# Patient Record
Sex: FEMALE | Race: WHITE | NOT HISPANIC OR LATINO | Employment: OTHER | URBAN - METROPOLITAN AREA
[De-identification: names, ages, dates, MRNs, and addresses within clinical notes are randomized per-mention and may not be internally consistent; named-entity substitution may affect disease eponyms.]

---

## 2021-09-29 ENCOUNTER — VBI (OUTPATIENT)
Dept: ADMINISTRATIVE | Facility: OTHER | Age: 71
End: 2021-09-29

## 2021-09-29 NOTE — TELEPHONE ENCOUNTER
Upon review of the In Basket request we were able to locate, review, and update the patient chart as requested for Mammogram     Any additional questions or concerns should be emailed to the Practice Liaisons via [email or web address] email, please do not reply via In Basket      Thank you  Al Rodriguez

## 2021-10-05 ENCOUNTER — ANNUAL EXAM (OUTPATIENT)
Dept: OBGYN CLINIC | Facility: CLINIC | Age: 71
End: 2021-10-05
Payer: MEDICARE

## 2021-10-05 VITALS
HEIGHT: 69 IN | SYSTOLIC BLOOD PRESSURE: 100 MMHG | BODY MASS INDEX: 28.73 KG/M2 | DIASTOLIC BLOOD PRESSURE: 60 MMHG | WEIGHT: 194 LBS

## 2021-10-05 DIAGNOSIS — Z87.81 HISTORY OF ANKLE FRACTURE: ICD-10-CM

## 2021-10-05 DIAGNOSIS — Z12.31 ENCOUNTER FOR SCREENING MAMMOGRAM FOR MALIGNANT NEOPLASM OF BREAST: ICD-10-CM

## 2021-10-05 DIAGNOSIS — N95.2 ATROPHIC VAGINITIS: ICD-10-CM

## 2021-10-05 DIAGNOSIS — E28.39 ESTROGEN DEFICIENCY: ICD-10-CM

## 2021-10-05 DIAGNOSIS — R39.15 URGENCY OF URINATION: ICD-10-CM

## 2021-10-05 DIAGNOSIS — Z01.419 ROUTINE GYNECOLOGICAL EXAMINATION: Primary | ICD-10-CM

## 2021-10-05 DIAGNOSIS — Z13.820 OSTEOPOROSIS SCREENING: ICD-10-CM

## 2021-10-05 PROCEDURE — G0101 CA SCREEN;PELVIC/BREAST EXAM: HCPCS | Performed by: OBSTETRICS & GYNECOLOGY

## 2021-10-05 RX ORDER — ESTRADIOL 0.1 MG/G
1 CREAM VAGINAL
Qty: 42.5 G | Refills: 3 | Status: SHIPPED | OUTPATIENT
Start: 2021-10-05 | End: 2022-10-05

## 2022-12-15 DIAGNOSIS — N95.2 ATROPHIC VAGINITIS: ICD-10-CM

## 2022-12-15 RX ORDER — ESTRADIOL 0.1 MG/G
CREAM VAGINAL
Qty: 43 G | Refills: 0 | Status: SHIPPED | OUTPATIENT
Start: 2022-12-15

## 2023-10-25 ENCOUNTER — TELEPHONE (OUTPATIENT)
Dept: OBGYN CLINIC | Facility: CLINIC | Age: 73
End: 2023-10-25

## 2023-10-25 NOTE — TELEPHONE ENCOUNTER
[facility] is requesting to restart Estrace vaginal cream. Advised pt to schedule an appt. Transferred her to .

## 2023-10-27 PROBLEM — Z01.419 ENCOUNTER FOR GYNECOLOGICAL EXAMINATION WITHOUT ABNORMAL FINDING: Status: ACTIVE | Noted: 2023-10-27

## 2023-10-27 PROBLEM — E28.39 ESTROGEN DEFICIENCY: Status: ACTIVE | Noted: 2023-10-27

## 2023-10-27 PROBLEM — Z87.81 HISTORY OF ANKLE FRACTURE: Status: ACTIVE | Noted: 2023-10-27

## 2023-10-27 PROBLEM — N95.2 ATROPHIC VAGINITIS: Status: ACTIVE | Noted: 2023-10-27

## 2023-11-06 ENCOUNTER — TELEPHONE (OUTPATIENT)
Dept: OBGYN CLINIC | Facility: CLINIC | Age: 73
End: 2023-11-06

## 2023-11-06 ENCOUNTER — ANNUAL EXAM (OUTPATIENT)
Dept: OBGYN CLINIC | Facility: CLINIC | Age: 73
End: 2023-11-06

## 2023-11-06 VITALS
HEIGHT: 71 IN | BODY MASS INDEX: 28.9 KG/M2 | WEIGHT: 206.4 LBS | SYSTOLIC BLOOD PRESSURE: 136 MMHG | DIASTOLIC BLOOD PRESSURE: 60 MMHG

## 2023-11-06 DIAGNOSIS — Z12.31 ENCOUNTER FOR SCREENING MAMMOGRAM FOR MALIGNANT NEOPLASM OF BREAST: ICD-10-CM

## 2023-11-06 DIAGNOSIS — N95.2 ATROPHIC VAGINITIS: ICD-10-CM

## 2023-11-06 DIAGNOSIS — E28.39 ESTROGEN DEFICIENCY: ICD-10-CM

## 2023-11-06 DIAGNOSIS — Z01.411 ENCOUNTER FOR GYNECOLOGICAL EXAMINATION WITH ABNORMAL FINDING: Primary | ICD-10-CM

## 2023-11-06 DIAGNOSIS — N95.1 MENOPAUSAL SYMPTOMS: ICD-10-CM

## 2023-11-06 DIAGNOSIS — R10.2 PELVIC PRESSURE IN FEMALE: ICD-10-CM

## 2023-11-06 DIAGNOSIS — Z87.81 HISTORY OF ANKLE FRACTURE: ICD-10-CM

## 2023-11-06 DIAGNOSIS — Z13.820 SCREENING FOR OSTEOPOROSIS: ICD-10-CM

## 2023-11-06 PROBLEM — N81.89 PELVIC FLOOR RELAXATION: Status: ACTIVE | Noted: 2023-11-06

## 2023-11-06 RX ORDER — VALSARTAN AND HYDROCHLOROTHIAZIDE 160; 25 MG/1; MG/1
1 TABLET ORAL DAILY
COMMUNITY

## 2023-11-06 RX ORDER — ASCORBIC ACID 500 MG
500 TABLET ORAL DAILY
COMMUNITY

## 2023-11-06 RX ORDER — ESTRADIOL 10 UG/1
1 INSERT VAGINAL 2 TIMES WEEKLY
Qty: 24 TABLET | Refills: 4 | Status: SHIPPED | OUTPATIENT
Start: 2023-11-06

## 2023-11-06 RX ORDER — MAGNESIUM OXIDE 400 MG/1
400 TABLET ORAL DAILY
COMMUNITY

## 2023-11-06 NOTE — PATIENT INSTRUCTIONS
Calcium 1200-1500mg + 800-1000 IU Vit D daily unless otherwise directed. Avoid falls. Exercise 150 minutes per week minimum including weight bearing exercises. DEXA scan-       . No further paps after age 72 as long as there has been adequate normal paps completed, no history of NIKI 2  or a more severe pap diagnosis in the last 20 years. Annual mammogram and monthly breast self exam recommended . Colonoscopy-        .  Amalia Cowper 20 times twice daily. Silicone based lubricant with sex. Vaginal moisturizers twice weekly as needed.

## 2023-11-06 NOTE — PROGRESS NOTES
[address] [place] [address], [address], [facility], [address] [facility],    ASSESSMENT/PLAN: Christopher Justin is a 81 y.o. No obstetric history on file. who presents for annual gynecologic exam.    Encounter for routine gynecologic examination  - Routine well woman exam completed today. - Cervical Cancer Screening: Current ASCCP Guidelines reviewed. Last Pap: 10/05/2021 . Next Pap Due: done   - Breast Cancer Screening: Last Mammogram 03/16/2022, order given   - Colorectal cancer screening was not ordered. - The following were reviewed in today's visit: breast self exam, mammography screening ordered, menopause, osteoporosis, adequate intake of calcium and vitamin D, exercise, healthy diet, DEXA ordered, and colonoscopy discussed and ordered    Additional problems addressed during this visit:  1. Encounter for gynecological examination with abnormal finding    2. Estrogen deficiency  -     DXA bone density spine hip and pelvis; Future  -     estradiol (VAGIFEM, YUVAFEM) 10 MCG TABS vaginal tablet; Insert 1 tablet (10 mcg total) into the vagina 2 (two) times a week  -     Ambulatory Referral to Gynecology; Future    3. Atrophic vaginitis  -     estradiol (VAGIFEM, YUVAFEM) 10 MCG TABS vaginal tablet; Insert 1 tablet (10 mcg total) into the vagina 2 (two) times a week  -     Ambulatory Referral to Gynecology; Future    4. History of ankle fracture  -     DXA bone density spine hip and pelvis; Future    5. Encounter for screening mammogram for malignant neoplasm of breast  -     Mammo screening bilateral w 3d & cad; Future    6. Menopausal symptoms  Comments:  Hx of pellet use > 4 hot flashs a day . Referral to  Dr Lorene Castellanos  Orders:  -     DXA bone density spine hip and pelvis; Future  -     estradiol (VAGIFEM, YUVAFEM) 10 MCG TABS vaginal tablet; Insert 1 tablet (10 mcg total) into the vagina 2 (two) times a week  -     Ambulatory Referral to Gynecology; Future    7.  Pelvic pressure in female  Comments:  Urgency w  urination  Pessary  fitting attempted  pt  not like  the placement and removal  No relaxation noted on exam.  referral to core  3    8. Screening for osteoporosis  -     DXA bone density spine hip and pelvis; Future        CC:  Annual Gynecologic Examination    HPI: Mayda Carson is a 81 y.o. No obstetric history on file. who presents for annual gynecologic examination. 82 yo here for gyn  fu  No bleeding, bloating or  satiety. + hot flashes    hx of  pelet  use   for  5 yeatrs  stopped in   2021. Uses the   estrogen   cream but messy       The following portions of the patient's history were reviewed and updated as appropriate: She  has a past medical history of Fibroids, Gastric reflux, and Hypertension. She  has a past surgical history that includes Hysterectomy; Mammo (historical); and Knee surgery (2020). Her family history is not on file. She  reports that she has never smoked. She has never used smokeless tobacco. She reports current alcohol use. She reports that she does not currently use drugs. Current Outpatient Medications   Medication Sig Dispense Refill    ascorbic acid (VITAMIN C) 500 MG tablet Take 500 mg by mouth daily      Cholecalciferol 25 MCG (1000 UT) tablet Take 1,000 Units by mouth daily      estradiol (VAGIFEM, YUVAFEM) 10 MCG TABS vaginal tablet Insert 1 tablet (10 mcg total) into the vagina 2 (two) times a week 24 tablet 4    losartan-hydrochlorothiazide (HYZAAR) 100-25 MG per tablet Take 1 tablet by mouth daily      magnesium oxide (MAG-OX) 400 mg tablet Take 400 mg by mouth daily      tretinoin (RETIN-A) 0.025 % cream APPLY PEA SIZE AMOUNT TO FACE NIGHTLY AS TOLERATED      valsartan-hydrochlorothiazide (DIOVAN-HCT) 160-25 MG per tablet Take 1 tablet by mouth daily       No current facility-administered medications for this visit. She has No Known Allergies. .    Review of Systems:  All systems normal except as noted in HPI          Objective:  BP 136/60 (BP Location: Left arm, Patient Position: Sitting, Cuff Size: Standard)   Ht 5' 10.5" (1.791 m)   Wt 93.6 kg (206 lb 6.4 oz)   Breastfeeding No   BMI 29.20 kg/m²    Physical Exam  Vitals and nursing note reviewed. Constitutional:       Appearance: Normal appearance. HENT:      Head: Normocephalic. Cardiovascular:      Rate and Rhythm: Normal rate and regular rhythm. Pulses: Normal pulses. Heart sounds: Normal heart sounds. Pulmonary:      Effort: Pulmonary effort is normal.      Breath sounds: Normal breath sounds. Chest:      Chest wall: No mass, lacerations, swelling, tenderness or edema. Breasts: Evaristo Score is 4. Breasts are symmetrical.      Right: Normal. No swelling, bleeding, inverted nipple, mass, nipple discharge, skin change or tenderness. Left: No swelling, bleeding, inverted nipple, mass, nipple discharge, skin change or tenderness. Abdominal:      General: Abdomen is flat. Bowel sounds are normal.      Palpations: Abdomen is soft. Genitourinary:     General: Normal vulva. Exam position: Lithotomy position. Pubic Area: No rash. Evaristo stage (genital): 4.      Labia:         Right: No rash, tenderness or lesion. Left: No rash, tenderness or lesion. Urethra: No urethral pain, urethral swelling or urethral lesion. Vagina: Normal.      Uterus: Absent. Adnexa: Right adnexa normal and left adnexa normal.      Rectum: Normal.            Comments: Levators   3   Attempted placement of pessary   + good support    Tight introitus    Musculoskeletal:         General: Normal range of motion. Cervical back: Neck supple. Lymphadenopathy:      Upper Body:      Right upper body: No supraclavicular, axillary or pectoral adenopathy. Left upper body: No supraclavicular, axillary or pectoral adenopathy. Lower Body: No right inguinal adenopathy. No left inguinal adenopathy. Skin:     General: Skin is warm and dry. Neurological:      General: No focal deficit present. Mental Status: She is alert. Psychiatric:         Mood and Affect: Mood normal.         Behavior: Behavior normal.         Thought Content:  Thought content normal.         Judgment: Judgment normal.

## 2023-11-06 NOTE — TELEPHONE ENCOUNTER
Patient asked about a mammogram order on her way out of her appointment today. If appropriate, please enter mammo order - I will mail it to patient. Thank you!

## 2023-12-18 ENCOUNTER — TELEPHONE (OUTPATIENT)
Dept: OBGYN CLINIC | Facility: CLINIC | Age: 73
End: 2023-12-18

## 2023-12-18 DIAGNOSIS — N95.2 ATROPHIC VAGINITIS: ICD-10-CM

## 2023-12-18 DIAGNOSIS — E28.39 ESTROGEN DEFICIENCY: Primary | ICD-10-CM

## 2023-12-18 DIAGNOSIS — N81.89 PELVIC FLOOR RELAXATION: ICD-10-CM

## 2023-12-18 RX ORDER — ESTRADIOL 0.1 MG/G
1 CREAM VAGINAL 3 TIMES WEEKLY
Qty: 42.5 G | Refills: 3 | Status: SHIPPED | OUTPATIENT
Start: 2023-12-18 | End: 2024-12-17

## 2023-12-18 NOTE — TELEPHONE ENCOUNTER
Patient called into office stating that her insurance does not cover the vaginal tablets.  Patient is requesting to go back on the Estrace cream if possible due to insurances issues.  Patient uses  the [facility] pharmacy in [place] as on file. Luisa, please advise for further assistance.

## 2023-12-26 PROBLEM — Z01.419 ENCOUNTER FOR GYNECOLOGICAL EXAMINATION WITHOUT ABNORMAL FINDING: Status: RESOLVED | Noted: 2023-10-27 | Resolved: 2023-12-26

## 2024-02-05 DIAGNOSIS — Z12.31 ENCOUNTER FOR SCREENING MAMMOGRAM FOR MALIGNANT NEOPLASM OF BREAST: ICD-10-CM

## 2024-03-12 ENCOUNTER — OFFICE VISIT (OUTPATIENT)
Dept: OBGYN CLINIC | Facility: CLINIC | Age: 74
End: 2024-03-12
Payer: MEDICARE

## 2024-03-12 DIAGNOSIS — R68.82 DECREASED LIBIDO: ICD-10-CM

## 2024-03-12 DIAGNOSIS — N95.1 MENOPAUSAL SYMPTOMS: Primary | ICD-10-CM

## 2024-03-12 PROCEDURE — 99215 OFFICE O/P EST HI 40 MIN: CPT | Performed by: OBSTETRICS & GYNECOLOGY

## 2024-03-12 NOTE — PROGRESS NOTES
"Assessment/Plan:       Diagnoses and all orders for this visit:    Menopausal symptoms  -     Estradiol; Future  -     Progesterone; Future  -     Estradiol  -     Progesterone    Decreased libido  -     Testosterone; Future  -     Testosterone        1) I discussed the long term health benefits of E2/PG, including: reduction of CVD risk, reduction of hyperlipidemia, reduction of DM and GLP-1 agonist activity with mild appetite suppression; reduction of colon CA, osteoporosis and cognitive decline, Alzheimer's disease. She is already a believer in this!!  2) NAMS ( [place] [employer]) guidelines are to offer APPROPRIATE dosed HRT for the APPROPRIATE time frame as long as benefits outweigh the risks, and age is not a determining factor for stopping it.  3) I still recommend progesterone replacement for hysterectomized women for breast protection against breast cancer as well as fantastic symptom resolution of sleep disturbance, mood, anxiety and agitation. She has read the same and would like to add this in to regimen.  4) Testosterone: her levels in her record on therapy were not extraordinarily high, in the 50-60s range, which are premenopausal values. They are quoted as \" abnormal\" because to be a normal aging woman means to feel fat, grumpy, no energy, bad mood, and sexless. She is suboptimal now.   5) I am unequipped to do pellets with [facility], so recommended a combination cream once baseline labs obtained,  mostly from her request. All will be low. I discussed my philosophy of treating the patient not the lab, consent on labs once yearly if needed.   6) Will call in estradiol 1 mg/ [id number]/testosterone 2 mg/ml, use 0.5 ml daily. Titrate up according to symptom resolution.   7) Follow up one year or prn    This was a 50 minute visit with greater than 50% of time spent in face to face counseling and coordination of care.    Subjective:      Patient ID: Mikaela Oliver is a 73 y.o. " female.    Mikaela  presents for hormonal consult, her first visit with me; referred by KELLE Rosas, gyn provider with [facility]  Mikaela has been menopausal for almost 20 years, s/p TLH with ovarian conservation ae 50. Was on oral estradiol for several years, then was told to stop and do black cohosh instead.   She then found the natividad of testosterone/estradiol pellets with provider in [place] and felt fantastic for 4 years! Was then told to go off once more by her other providers and her old symptoms have promptly returned:   1) Terrible hot flashes  2) poor sleep  3) agitated, irritable mood.  She wants to know why she can not continue these and if creams would be a better option?  PMX: as above; hx of L TKA, R knee PRP; HTN  SHX: denies tobacco ETOH  FHX: mom COD pancreatic Ca 80; Dad COD CV 63. 2 siblings both healthy. No children      Review of Systems   Constitutional:  Positive for unexpected weight change. Negative for fatigue.   Endocrine: Positive for heat intolerance.   Genitourinary: Negative.    Musculoskeletal: Negative.    Psychiatric/Behavioral:  Positive for agitation, dysphoric mood and sleep disturbance.        Objective:      There were no vitals taken for this visit.         Physical Exam  Constitutional:       Appearance: Normal appearance.   Neurological:      Mental Status: She is alert.

## 2024-03-16 LAB
ESTRADIOL SERPL-MCNC: <15 PG/ML
PROGEST SERPL-MCNC: <0.5 NG/ML
TESTOST SERPL-MCNC: 7 NG/DL (ref 2–45)

## 2024-03-18 ENCOUNTER — TELEPHONE (OUTPATIENT)
Age: 74
End: 2024-03-18

## 2024-03-18 DIAGNOSIS — N95.1 MENOPAUSAL SYMPTOMS: Primary | ICD-10-CM

## 2024-03-18 NOTE — TELEPHONE ENCOUNTER
Pt was seen 3/12/24.  Her lab work was completed on 3/13/24.  After the labwork was completed she stated the doctor was to call in prescriptions base off of her lab work

## 2024-03-20 ENCOUNTER — NURSE TRIAGE (OUTPATIENT)
Dept: OBGYN CLINIC | Facility: CLINIC | Age: 74
End: 2024-03-20

## 2024-03-20 NOTE — TELEPHONE ENCOUNTER
----- Message from Lyric Lara sent at 3/15/2024 11:33 AM EDT -----  Pt called  to check on Rx   for Biest Cream  that  Dr. Wendy Silverio was supposed to be sent to [facility]

## 2024-03-22 NOTE — TELEPHONE ENCOUNTER
Looks like the cream was sent on 3/18/24. LM for patient letting her know it's at the pharmacy. Any issues to call the office and we can always call it in to the pharmacy. Gave office number. Left message on phone.

## 2024-10-04 DIAGNOSIS — N95.1 MENOPAUSAL SYMPTOMS: ICD-10-CM

## 2024-10-04 NOTE — TELEPHONE ENCOUNTER
Reason for call: NO Refills available at pharmacy   [x] Refill   [] Prior Auth  [] Other:     Office:   [] PCP/Provider -   [x] Specialty/Provider - [facility]     Medication: estradiol 1 mg/ progesterone 100 mg/ testosterone 2 mg/ml     Dose/Frequency: apply 0.5 ml to inner thigh or labia candi     Quantity: 15 ml    Pharmacy: [facility] [phone]    Does the patient have enough for 3 days?   [x] Yes   [] No - Send as HP to POD

## 2024-10-23 ENCOUNTER — TELEPHONE (OUTPATIENT)
Age: 74
End: 2024-10-23

## 2024-10-23 NOTE — TELEPHONE ENCOUNTER
Caller: Patient    Doctor: Tom    Reason for call: Patient called to schedule an appointment, call dropped.     Call back#: [phone]

## 2024-11-11 ENCOUNTER — APPOINTMENT (OUTPATIENT)
Age: 74
End: 2024-11-11
Payer: MEDICARE

## 2024-11-11 ENCOUNTER — OFFICE VISIT (OUTPATIENT)
Age: 74
End: 2024-11-11
Payer: MEDICARE

## 2024-11-11 VITALS
SYSTOLIC BLOOD PRESSURE: 122 MMHG | BODY MASS INDEX: 28.84 KG/M2 | WEIGHT: 206 LBS | DIASTOLIC BLOOD PRESSURE: 74 MMHG | HEIGHT: 71 IN

## 2024-11-11 DIAGNOSIS — M25.561 RIGHT KNEE PAIN, UNSPECIFIED CHRONICITY: ICD-10-CM

## 2024-11-11 DIAGNOSIS — M25.59 PAIN IN OTHER SPECIFIED JOINT: ICD-10-CM

## 2024-11-11 DIAGNOSIS — Z01.89 ENCOUNTER FOR LOWER EXTREMITY COMPARISON IMAGING STUDY: ICD-10-CM

## 2024-11-11 DIAGNOSIS — M17.11 PRIMARY OSTEOARTHRITIS OF RIGHT KNEE: Primary | ICD-10-CM

## 2024-11-11 PROCEDURE — 77073 BONE LENGTH STUDIES: CPT

## 2024-11-11 PROCEDURE — 73560 X-RAY EXAM OF KNEE 1 OR 2: CPT

## 2024-11-11 PROCEDURE — 99204 OFFICE O/P NEW MOD 45 MIN: CPT | Performed by: STUDENT IN AN ORGANIZED HEALTH CARE EDUCATION/TRAINING PROGRAM

## 2024-11-11 PROCEDURE — 73564 X-RAY EXAM KNEE 4 OR MORE: CPT

## 2024-11-11 RX ORDER — CHLORHEXIDINE GLUCONATE ORAL RINSE 1.2 MG/ML
15 SOLUTION DENTAL ONCE
OUTPATIENT
Start: 2024-11-11 | End: 2024-11-11

## 2024-11-11 RX ORDER — ACETAMINOPHEN 325 MG/1
975 TABLET ORAL ONCE
OUTPATIENT
Start: 2024-11-11 | End: 2024-11-11

## 2024-11-11 RX ORDER — SODIUM CHLORIDE, SODIUM LACTATE, POTASSIUM CHLORIDE, CALCIUM CHLORIDE 600; 310; 30; 20 MG/100ML; MG/100ML; MG/100ML; MG/100ML
125 INJECTION, SOLUTION INTRAVENOUS CONTINUOUS
OUTPATIENT
Start: 2024-11-11

## 2024-11-11 RX ORDER — MULTIVIT-MIN/IRON FUM/FOLIC AC 7.5 MG-4
1 TABLET ORAL DAILY
Qty: 30 TABLET | Refills: 1 | Status: SHIPPED | OUTPATIENT
Start: 2024-11-11 | End: 2024-11-11 | Stop reason: SDUPTHER

## 2024-11-11 RX ORDER — FOLIC ACID 1 MG/1
1 TABLET ORAL DAILY
Qty: 30 TABLET | Refills: 1 | Status: SHIPPED | OUTPATIENT
Start: 2024-11-11 | End: 2024-11-11 | Stop reason: SDUPTHER

## 2024-11-11 RX ORDER — CHLORHEXIDINE GLUCONATE 40 MG/ML
SOLUTION TOPICAL DAILY PRN
OUTPATIENT
Start: 2024-11-11

## 2024-11-11 RX ORDER — FOLIC ACID 1 MG/1
1 TABLET ORAL DAILY
Qty: 30 TABLET | Refills: 1 | Status: SHIPPED | OUTPATIENT
Start: 2024-11-11

## 2024-11-11 RX ORDER — ASCORBIC ACID 500 MG
500 TABLET ORAL 2 TIMES DAILY
Qty: 60 TABLET | Refills: 1 | Status: SHIPPED | OUTPATIENT
Start: 2024-11-11 | End: 2024-11-11 | Stop reason: SDUPTHER

## 2024-11-11 RX ORDER — MUPIROCIN 20 MG/G
OINTMENT TOPICAL
Qty: 15 G | Refills: 1 | Status: SHIPPED | OUTPATIENT
Start: 2024-11-11 | End: 2024-11-11 | Stop reason: SDUPTHER

## 2024-11-11 RX ORDER — MUPIROCIN 20 MG/G
OINTMENT TOPICAL
Qty: 15 G | Refills: 1 | Status: SHIPPED | OUTPATIENT
Start: 2024-11-11

## 2024-11-11 RX ORDER — ASCORBIC ACID 500 MG
500 TABLET ORAL 2 TIMES DAILY
Qty: 60 TABLET | Refills: 1 | Status: SHIPPED | OUTPATIENT
Start: 2024-11-11

## 2024-11-11 RX ORDER — MULTIVIT-MIN/IRON FUM/FOLIC AC 7.5 MG-4
1 TABLET ORAL DAILY
Qty: 30 TABLET | Refills: 1 | Status: SHIPPED | OUTPATIENT
Start: 2024-11-11

## 2024-11-11 NOTE — PROGRESS NOTES
Knee New Office Note    Assessment:     1. Primary osteoarthritis of right knee    2. Right knee pain, unspecified chronicity    3. Encounter for lower extremity comparison imaging study        Plan:    Mikaela is a very pleasant 73 year old female with over 3 years of atraumatic right knee pain that progressively worsened secondary to primary osteoarthritis. She has attempted and failed non-operative management in the form of weight loss, physical therapy, activity modification, intra-articular corticosteroid injections within the last 6 months, and PRP injections. She describes the pain as severe and unrelenting and rates it a 10/10 at its worst. The pain is preventing her from performing her activities of daily living effectively. She denies a history of diabetes, thyroid abnormalities, MRSA infection, smoking, Blood clots, cancer, tumeric use, and blood thinner use.     Due to her failure of extensive non-op management she was indicated for operative treatment of her primary osteoarthritis of her right knee in the form of total knee arthroplasty. The patient has elected to proceed with right TKA. Risks and benefits of surgery to include but not limited to bleeding, infection, damage to surrounding structures, hardware failure, instability, fracture, dislocation, need for further surgery, continued pain, stiffness, blood clots, stroke, and heart attack was discussed with the patient. Informed consent was signed today in the office. The patient has met with our surgical schedulers and our preoperative joint replacement pathway has been initiated. All questions were answered. Patient will follow-up 2 weeks post operatively. BMI is acceptable at 29 and is a nonsmoker.         Diagnoses and all orders for this visit:    Primary osteoarthritis of right knee    Right knee pain, unspecified chronicity  -     XR knee 4+ vw right injury; Future  -     XR bone length (scanogram); Future    Encounter for lower extremity  Luz NP called and informed first troponin was 247 and second troponin done at 2130 tonight was 279 and patient now on Heparin iv drip. Also Luz informed patient has a persistent non-productive cough and patient takes Lasix 40 mg po every AM and patient's pro-bnp 15,180. Patient scheduled to go for CT CHEST R/O PE. Luz NP will get back with me regarding any further orders.   comparison imaging study  -     XR knee 1 or 2 vw left; Future  -     XR bone length (scanogram); Future       Subjective:     Patient ID: Mikaela Oliver is a 73 y.o. female.  Chief Complaint:  HPI:  73 y.o. female with over 3 years of atraumatic right knee pain that progressively worsened secondary to primary osteoarthritis. She has been treating for years exhausting all conservative measures including HEP, PT, IA CSI, visco and PRP injections. She had decent relief early on and now has limited to no relief with conservative management. Her right knee pain is affecting her quality of life and limiting her activities. She was previously following with Dr. Poole at [facility] and PCP. She is s/p L TKA by Zulema approximately 3 years ago. She was referred to myself by her cousin who is also s/p TKA by myself.     Allergy:  No Known Allergies  Medications:  all current active meds have been reviewed  Past Medical History:  Past Medical History:   Diagnosis Date    Fibroids     Gastric reflux     Hypertension      Past Surgical History:  Past Surgical History:   Procedure Laterality Date    HYSTERECTOMY      MARCELLA/BSO    KNEE SURGERY  2020    Left replacement    MAMMO (HISTORICAL)       Family History:  History reviewed. No pertinent family history.  Social History:  Social History     Substance and Sexual Activity   Alcohol Use Yes     Social History     Substance and Sexual Activity   Drug Use Not Currently     Social History     Tobacco Use   Smoking Status Never   Smokeless Tobacco Never           ROS:  General: Per HPI  Skin: Negative, except if noted below  HEENT: Negative  Respiratory: Negative  Cardiovascular: Negative  Gastrointestinal: Negative  Urinary: Negative  Vascular: Negative  Musculoskeletal: Positive per HPI   Neurologic: Positive per HPI  Endocrine: Negative    Objective:  BP Readings from Last 1 Encounters:   11/11/24 122/74      Wt Readings from Last 1 Encounters:   11/11/24 93.4 kg (206 lb)     "    Respiratory:   non-labored respirations    Lymphatics:  no palpable lymph nodes    Gait:   Antalgic     Neurologic:   Alert and oriented times 3  Patient with normal sensation except as noted below  Deep tendon reflexes 2+ except as noted in MSK exam    Bilateral Lower Extremity:  Right Knee:      Inspection:  intact     Overall limb alignment Varus    Effusion: Moderate    ROM 3-110 with pain    Extensor Lag: none    Palpation: Medial and lateral Joint line tenderness to palpation    AP Stability at 90 deg stable    M/L stability in full extension stable    M/L stability in midflexion stable    Motor: 5/5 Q/HS/TA/GS/P    Pulses: 2+ DP / 2+ PT    SILT DP/SP/S/S/TN    Left knee:     Inspection:  anterior surgical scar well appearing     Overall limb alignment Varus    Effusion: None    ROM 0-115 without pain    Extensor Lag: none    Palpation: No Joint line tenderness to palpation    AP Stability at 90 deg stable    M/L stability in full extension stable    M/L stability in midflexion stable    Motor: 5/5 Q/HS/TA/GS/P    Pulses: 2+ DP / 2+ PT    SILT DP/SP/S/S/TN    Imaging:  My interpretation XR AP scanogram/AP bilateral knee/lateral/mullins/sunrise right knee: severe joint space narrowing, subchondral sclerosis, subchondral cysts, osteophyte formation. Bone on bone changes worse in the medial compartment. No fracture or dislocation. Varus alignment. Left knee press-fit PS RP attune on AP view.     BMI:   Estimated body mass index is 29.14 kg/m² as calculated from the following:    Height as of this encounter: 5' 10.5\" (1.791 m).    Weight as of this encounter: 93.4 kg (206 lb).  BSA:   Estimated body surface area is 2.12 meters squared as calculated from the following:    Height as of this encounter: 5' 10.5\" (1.791 m).    Weight as of this encounter: 93.4 kg (206 lb).           Scribe Attestation      I,:   am acting as a scribe while in the presence of the attending physician.:       I,:   personally " performed the services described in this documentation    as scribed in my presence.:

## 2024-11-11 NOTE — PROGRESS NOTES
Knee New Office Note    Assessment:     1. Right knee pain, unspecified chronicity    2. Encounter for lower extremity comparison imaging study        Plan:   {Assess/PlanSmartLinks:34180}   Subjective:     Patient ID: Mikaela Oliver is a 73 y.o. female.  Chief Complaint:  HPI:  73 y.o. female       Allergy:  No Known Allergies  Medications:  {SL IP MEDS:456480383}  Past Medical History:  Past Medical History:   Diagnosis Date    Fibroids     Gastric reflux     Hypertension      Past Surgical History:  Past Surgical History:   Procedure Laterality Date    HYSTERECTOMY      MARCELLA/BSO    KNEE SURGERY  2020    Left replacement    MAMMO (HISTORICAL)       Family History:  History reviewed. No pertinent family history.  Social History:  Social History     Substance and Sexual Activity   Alcohol Use Yes     Social History     Substance and Sexual Activity   Drug Use Not Currently     Social History     Tobacco Use   Smoking Status Never   Smokeless Tobacco Never           ROS:  General: Per HPI  Skin: Negative, except if noted below  HEENT: Negative  Respiratory: Negative  Cardiovascular: Negative  Gastrointestinal: Negative  Urinary: Negative  Vascular: Negative  Musculoskeletal: Positive per HPI   Neurologic: Positive per HPI  Endocrine: Negative    Objective:  BP Readings from Last 1 Encounters:   11/11/24 122/74      Wt Readings from Last 1 Encounters:   11/11/24 93.4 kg (206 lb)        Respiratory:   non-labored respirations    Lymphatics:  no palpable lymph nodes    Gait:   ***    Neurologic:   Alert and oriented times ***  Patient with normal sensation except as noted below  Deep tendon reflexes 2+ except as noted in MSK exam    Bilateral Lower Extremity:  Left Knee:      Inspection:  {skin intact/incisions}    Overall limb alignment {varus/valgus}    Effusion: ***    ROM *** {with/wo} pain    Extensor Lag: ***    Palpation: {medial/lateral/no} Joint line tenderness to palpation    AP Stability at 90 deg ***    M/L  "stability in full extension ***    M/L stability in midflexion ***    Motor: {5}/5 Q/HS/TA/GS/P    Pulses: ***+ DP / ***+ PT    SILT DP/SP/S/S/TN    Right knee:     Inspection:  {skin intact/incisions}    Overall limb alignment {varus/valgus}    Effusion: ***    ROM *** {with/wo} pain    Extensor Lag: ***    Palpation: {medial/lateral/no} Joint line tenderness to palpation    AP Stability at 90 deg ***    M/L stability in full extension ***    M/L stability in midflexion ***    Motor: {5}/5 Q/HS/TA/GS/P    Pulses: ***+ DP / ***+ PT    SILT DP/SP/S/S/TN    Imaging:  My interpretation XR AP scanogram/AP bilateral knee/lateral/mullins/sunrise *** knee: {no,mild,moderate,severe} joint space narrowing, subchondral sclerosis, subchondral cysts, osteophyte formation. No fracture or dislocation.     BMI:   Estimated body mass index is 29.14 kg/m² as calculated from the following:    Height as of this encounter: 5' 10.5\" (1.791 m).    Weight as of this encounter: 93.4 kg (206 lb).  BSA:   Estimated body surface area is 2.12 meters squared as calculated from the following:    Height as of this encounter: 5' 10.5\" (1.791 m).    Weight as of this encounter: 93.4 kg (206 lb).           Scribe Attestation      I,:   am acting as a scribe while in the presence of the attending physician.:       I,:   personally performed the services described in this documentation    as scribed in my presence.:              "

## 2024-11-13 ENCOUNTER — TELEPHONE (OUTPATIENT)
Age: 74
End: 2024-11-13

## 2024-11-13 NOTE — TELEPHONE ENCOUNTER
Caller: Patient    Doctor: Tom    Reason for call: Patient called with questions regarding medications. Patient requested call back on 11/14 due to lack of cellphone service. Please advise.    Call back#: [phone]

## 2024-11-15 ENCOUNTER — TELEPHONE (OUTPATIENT)
Dept: OBGYN CLINIC | Facility: HOSPITAL | Age: 74
End: 2024-11-15

## 2024-11-15 DIAGNOSIS — M17.11 PRIMARY OSTEOARTHRITIS OF RIGHT KNEE: Primary | ICD-10-CM

## 2024-11-15 RX ORDER — DICLOFENAC SODIUM 75 MG/1
75 TABLET, DELAYED RELEASE ORAL 2 TIMES DAILY
Qty: 60 TABLET | Refills: 1 | Status: SHIPPED | OUTPATIENT
Start: 2024-11-15

## 2024-11-15 NOTE — TELEPHONE ENCOUNTER
Diclofenac sent to the pharmacy for the patient to try pre-op. No other NSAIDs with the diclofenac, but she can use Tylenol with it.  Thanks!

## 2024-11-15 NOTE — TELEPHONE ENCOUNTER
Caller: Mikaela (Patient)    Doctor: Dr. Santos    Reason for call: Patient was seen on 11/11/24, states she thought she was going to be prescribed something to take for her pain in the meantime while waiting to have surgery. States she discussed with you that Tylenol and Advil have not been helping.    Please advise.    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

## 2024-11-25 ENCOUNTER — PREP FOR PROCEDURE (OUTPATIENT)
Age: 74
End: 2024-11-25

## 2024-12-09 ENCOUNTER — TELEPHONE (OUTPATIENT)
Dept: OBGYN CLINIC | Facility: HOSPITAL | Age: 74
End: 2024-12-09

## 2024-12-09 NOTE — TELEPHONE ENCOUNTER
Preoperative Elective Admission Assessment: spoke to patient     EKG/LAB/MRSA SWAB/CXR date: 12/23     Living Situation:    Who does pt live with: spouse  What kind of home: multi-level  How do they enter the home: front  How many levels in home: 2 level home, bedroom on 2nd floor.    # of steps to enter home: 0 to enter   # of steps to second floor: 12 to 2nd floor.   Are there handrails: Yes  Are there landings: No  Sleeping arrangement: first/entry floor  Where is Bathroom: First floor bath- half bath   Where is the tub or shower: Second floor full bath with a step in tub with seat   Toilet height? Concerns for low toilets Higher toilet upstairs- putting railings on each side.      First Floor Setup:   Is there a bathroom: Yes  Where would pt sleep: couch     DME:  Denies DME- ordering RW. Instructed to bring DOS.   We discussed clearing pathways in the home and making sure there is accessibly to use the walker, for example, removing throw rugs.      Patient's Current Level of Function: Ambulates: Independently and ADLs: Independent    Post-op Caregiver: spouse  Currently receive any HHC/aides/community supports: No     Post-op Transport: spouse  To/from hospital: spouse  To/from PT 2-3x/week: spouse  Uses community transport now: No     Outpatient Physical Therapy Site:  Site: [facility]   pre and post-op appts scheduled? No     Medication Management: self  Preferred Pharmacy for Post-op Medications: [id number] - [place], [place] - [address] [facility] [address] [[id number]]   Blood Management Vitamin Regimen:  Reviewed with patient to start 30 days before  Post-op anticoagulant: to be determined by surgical team postoperatively  *Has Bactroban at home for 5 days preop- reviewed preoperative bathing instructions.      DC Plan: Pt plans to be discharged home    Barriers to DC identified preoperatively: none identified    BMI: 29.14    Patient Education:  Pt educated on post-op pain, early mobilization (POD0), LOS  goals, OP PT goals, and preoperative bathing. Patient educated that our goal is to appropriately discharge patient based off their post-op function while striving to maintain maximal independence. The goal is to discharge patient to home and for them to attend outpatient physical therapy.    Assigned to care team? Yes

## 2024-12-23 ENCOUNTER — OFFICE VISIT (OUTPATIENT)
Dept: LAB | Facility: HOSPITAL | Age: 74
End: 2024-12-23
Payer: MEDICARE

## 2024-12-23 ENCOUNTER — APPOINTMENT (OUTPATIENT)
Dept: LAB | Facility: HOSPITAL | Age: 74
End: 2024-12-23
Payer: MEDICARE

## 2024-12-23 DIAGNOSIS — M25.59 PAIN IN OTHER SPECIFIED JOINT: ICD-10-CM

## 2024-12-23 DIAGNOSIS — M17.11 PRIMARY OSTEOARTHRITIS OF RIGHT KNEE: ICD-10-CM

## 2024-12-23 DIAGNOSIS — M25.561 RIGHT KNEE PAIN, UNSPECIFIED CHRONICITY: ICD-10-CM

## 2024-12-23 PROBLEM — L20.9 ATOPIC DERMATITIS: Status: ACTIVE | Noted: 2021-01-15

## 2024-12-23 PROBLEM — H90.0 CONDUCTIVE HEARING LOSS, BILATERAL: Status: ACTIVE | Noted: 2024-12-23

## 2024-12-23 PROBLEM — L57.0 ACTINIC KERATOSIS: Status: ACTIVE | Noted: 2022-01-18

## 2024-12-23 PROBLEM — R73.01 IFG (IMPAIRED FASTING GLUCOSE): Status: ACTIVE | Noted: 2020-10-05

## 2024-12-23 PROBLEM — E78.5 DYSLIPIDEMIA: Status: ACTIVE | Noted: 2020-10-05

## 2024-12-23 PROBLEM — I10 ESSENTIAL HYPERTENSION: Status: ACTIVE | Noted: 2020-10-05

## 2024-12-23 LAB
ABO GROUP BLD: NORMAL
ALBUMIN SERPL BCG-MCNC: 4.4 G/DL (ref 3.5–5)
ALP SERPL-CCNC: 51 U/L (ref 34–104)
ALT SERPL W P-5'-P-CCNC: 14 U/L (ref 7–52)
ANION GAP SERPL CALCULATED.3IONS-SCNC: 6 MMOL/L (ref 4–13)
APTT PPP: 30 SECONDS (ref 23–34)
AST SERPL W P-5'-P-CCNC: 18 U/L (ref 13–39)
BASOPHILS # BLD AUTO: 0.05 THOUSANDS/ÂΜL (ref 0–0.1)
BASOPHILS NFR BLD AUTO: 1 % (ref 0–1)
BILIRUB SERPL-MCNC: 1.01 MG/DL (ref 0.2–1)
BLD GP AB SCN SERPL QL: NEGATIVE
BUN SERPL-MCNC: 18 MG/DL (ref 5–25)
CALCIUM SERPL-MCNC: 9 MG/DL (ref 8.4–10.2)
CHLORIDE SERPL-SCNC: 103 MMOL/L (ref 96–108)
CO2 SERPL-SCNC: 29 MMOL/L (ref 21–32)
CREAT SERPL-MCNC: 0.78 MG/DL (ref 0.6–1.3)
EOSINOPHIL # BLD AUTO: 0.16 THOUSAND/ÂΜL (ref 0–0.61)
EOSINOPHIL NFR BLD AUTO: 3 % (ref 0–6)
ERYTHROCYTE [DISTWIDTH] IN BLOOD BY AUTOMATED COUNT: 12.8 % (ref 11.6–15.1)
EST. AVERAGE GLUCOSE BLD GHB EST-MCNC: 120 MG/DL
GFR SERPL CREATININE-BSD FRML MDRD: 76 ML/MIN/1.73SQ M
GLUCOSE P FAST SERPL-MCNC: 124 MG/DL (ref 65–99)
HBA1C MFR BLD: 5.8 %
HCT VFR BLD AUTO: 40.9 % (ref 34.8–46.1)
HGB BLD-MCNC: 14 G/DL (ref 11.5–15.4)
IMM GRANULOCYTES # BLD AUTO: 0.02 THOUSAND/UL (ref 0–0.2)
IMM GRANULOCYTES NFR BLD AUTO: 0 % (ref 0–2)
INR PPP: 1.02 (ref 0.85–1.19)
LYMPHOCYTES # BLD AUTO: 1.94 THOUSANDS/ÂΜL (ref 0.6–4.47)
LYMPHOCYTES NFR BLD AUTO: 37 % (ref 14–44)
MCH RBC QN AUTO: 29.7 PG (ref 26.8–34.3)
MCHC RBC AUTO-ENTMCNC: 34.2 G/DL (ref 31.4–37.4)
MCV RBC AUTO: 87 FL (ref 82–98)
MONOCYTES # BLD AUTO: 0.37 THOUSAND/ÂΜL (ref 0.17–1.22)
MONOCYTES NFR BLD AUTO: 7 % (ref 4–12)
NEUTROPHILS # BLD AUTO: 2.78 THOUSANDS/ÂΜL (ref 1.85–7.62)
NEUTS SEG NFR BLD AUTO: 52 % (ref 43–75)
NRBC BLD AUTO-RTO: 0 /100 WBCS
PLATELET # BLD AUTO: 234 THOUSANDS/UL (ref 149–390)
PMV BLD AUTO: 10.7 FL (ref 8.9–12.7)
POTASSIUM SERPL-SCNC: 4.4 MMOL/L (ref 3.5–5.3)
PROT SERPL-MCNC: 7.1 G/DL (ref 6.4–8.4)
PROTHROMBIN TIME: 13.9 SECONDS (ref 12.3–15)
RBC # BLD AUTO: 4.71 MILLION/UL (ref 3.81–5.12)
RH BLD: NEGATIVE
SODIUM SERPL-SCNC: 138 MMOL/L (ref 135–147)
SPECIMEN EXPIRATION DATE: NORMAL
WBC # BLD AUTO: 5.32 THOUSAND/UL (ref 4.31–10.16)

## 2024-12-23 PROCEDURE — 83036 HEMOGLOBIN GLYCOSYLATED A1C: CPT

## 2024-12-23 PROCEDURE — 80053 COMPREHEN METABOLIC PANEL: CPT

## 2024-12-23 PROCEDURE — 86850 RBC ANTIBODY SCREEN: CPT

## 2024-12-23 PROCEDURE — 87081 CULTURE SCREEN ONLY: CPT

## 2024-12-23 PROCEDURE — 85610 PROTHROMBIN TIME: CPT

## 2024-12-23 PROCEDURE — 93005 ELECTROCARDIOGRAM TRACING: CPT

## 2024-12-23 PROCEDURE — 85730 THROMBOPLASTIN TIME PARTIAL: CPT

## 2024-12-23 PROCEDURE — 36415 COLL VENOUS BLD VENIPUNCTURE: CPT

## 2024-12-23 PROCEDURE — 86901 BLOOD TYPING SEROLOGIC RH(D): CPT

## 2024-12-23 PROCEDURE — 86900 BLOOD TYPING SEROLOGIC ABO: CPT

## 2024-12-23 PROCEDURE — 85025 COMPLETE CBC W/AUTO DIFF WBC: CPT

## 2024-12-23 RX ORDER — CYCLOBENZAPRINE HCL 5 MG
TABLET ORAL 3 TIMES DAILY
COMMUNITY

## 2024-12-23 RX ORDER — CELECOXIB 100 MG/1
100 CAPSULE ORAL DAILY
COMMUNITY

## 2024-12-23 RX ORDER — ALPRAZOLAM 0.25 MG/1
0.25 TABLET ORAL 3 TIMES DAILY PRN
COMMUNITY

## 2024-12-23 NOTE — PROGRESS NOTES
Internal Medicine Pre-Operative Evaluation:     Reason for Visit: Pre-operative Evaluation for Risk Stratification and Optimization    Patient ID: Mikaela Oliver is a 74 y.o. female.     Case: [id number] Date/Time: 01/15/25 1415   Procedure: ARTHROPLASTY KNEE TOTAL- SAME DAY (Right: Knee)   Anesthesia type: Choice   Diagnosis:      Right knee pain, unspecified chronicity [M25.561]      Primary osteoarthritis of right knee [M17.11]   Pre-op diagnosis:      Right knee pain, unspecified chronicity [M25.561]      Primary osteoarthritis of right knee [M17.11]   Location:  OR ROOM 02 / [facility]   Surgeons: Carlos Santos DO         Recommendations to Proceed withSurgery    Patient is considered to be Low risk for Medium risk procedure.     After evaluation and discussion with patient with emphasis that all surgery has some degree of inherent risk it is acknowledged by patient this risk is Acceptable.    Patient is optimized and may proceed with planned procedure.     Assessment    Pre-operative Medical Evaluation for planned surgery  Recommendations as listed in PLAN section below  Contact surgical nurse  navigator with any questions regarding preoperative plan or schedule.      Assessment & Plan  Primary osteoarthritis of right knee  Failed outpatient conservative measures  Elected to undergo total joint arthroplasty  Currently she is having reconsideration about proceeding with surgery and would like to delay it for three months    IFG (impaired fasting glucose)  Follow consistent carb diet  Follow instructions specified in AVS regarding  dosing or holding diabetic medications and (if applicable )any insulin instructions as per the after visit summary    Essential hypertension  Hold diavan/hct  morning of surgery    Preoperative clearance    Right knee pain, unspecified chronicity             Plan:     1. Further preoperative workup as follows:   - none no further testing  required may proceed with surgery    2. Preoperative Medication Management Review performed by PAT nursing  YES    3. Patient requires further consultation with:   No Consults Required    4. Discharge Planning / Barriers to Discharge  none identified - patients has post discharge therapy plan in place, transportation arranged for discharge day, adequate family support at home to assist with discharge to home.        Subjective:           History of Present Illness:     Mikaela Oliver is a 74 y.o. female who presents to the office today for a preoperative consultation at the request of surgeon. The patient understands this is an elective procedure and not emergent. They are electing to undergo planned procedure with an understanding that all surgery has inherent risk. They have worked with their surgeon and failed conservative treatment measures. Today they present for preoperative risk assessment and recommendations for optimization in preparation for surgery.    Pt seen in surgical optimization center for upcoming proposed surgery. They have failed previous conservative measures and have elected surgical intervention.     Pt meets presurgical lab and BMI optimization goals.      Mikaela Oliver has an IN HOSPITAL cardiac risk of RCI RISK CLASS I (0 risk factors, risk of major cardiac compl. appr. 0.5%) based on RCRI calculator    Cardiac Risk Estimation: per the Revised Cardiac Risk Index (Circ. 100:1043, 1999),         Pre-op Exam    Pre-operative Risk Factors:    History of cerebrovascular disease: No    History of ischemic heart disease: No  Pre-operative treatment with insulin: No  Pre-operative creatinine >2 mg/dL: No    History of congestive heart failure: No        ROS: No TIA's or unusual headaches, no dysphagia.  No prolonged cough. No dyspnea or chest pain on exertion.  No abdominal pain, change in bowel habits, black or bloody stools.  No urinary tract or BPH symptoms.  Positive reported pain in arthritic  "joint. Positive difficulty with gait. No skin rashes or issues.      Objective:    /80   Pulse 73   Ht 5' 10\" (1.778 m)   Wt 93.9 kg (207 lb)   SpO2 97%   BMI 29.70 kg/m²       General Appearance: no distress, conversive  HEENT: PERRLA, conjuctiva normal; oropharynx clear; mucous membranes moist;   Neck:  Supple, no lymphadenopathy or thyromegaly  Lungs: breath sounds normal, normal respiratory effort, no retractions, expiratory effort normal  CV: normal heart sounds S1/S2, PMI normal   ABD: soft non tender, no masses , no hepatic or splenomegaly  EXT: DP pulses intact, no lymphadenopathy, no edema  Skin: normal turgor, normal texture, no rash  Psych: affect normal, mood normal  Neuro: AAOx3        The following portions of the patient's history were reviewed and updated as appropriate: allergies, current medications, past family history, past medical history, past social history, past surgical history and problem list.     Past History:       Past Medical History:   Diagnosis Date    Fibroids     Gastric reflux     Hypertension     Past Surgical History:   Procedure Laterality Date    HYSTERECTOMY      MARCELLA/BSO    KNEE SURGERY  2020    Left replacement    MAMMO (HISTORICAL)            Social History     Tobacco Use    Smoking status: Never    Smokeless tobacco: Never   Vaping Use    Vaping status: Never Used   Substance Use Topics    Alcohol use: Yes    Drug use: Not Currently     No family history on file.       Allergies:     No Known Allergies     Current Medications:     Current Outpatient Medications   Medication Instructions    ALPRAZolam (XANAX) 0.25 mg, Oral, 3 times daily PRN    ascorbic acid (VITAMIN C) 500 mg, Daily    ascorbic acid (VITAMIN C) 500 mg, Oral, 2 times daily    celecoxib (CELEBREX) 100 mg, Oral, Daily    Cholecalciferol (VITAMIN D3) 1,000 Units, Daily    Cholecalciferol (VITAMIN D3) 2,000 Units, Oral, Daily    cyclobenzaprine (FLEXERIL) 5 mg tablet 3 times daily    diclofenac " "(VOLTAREN) 75 mg, Oral, 2 times daily    estradiol (ESTRACE VAGINAL) 1 g, Vaginal, 3 times weekly    folic acid (FOLVITE) 1 mg, Oral, Daily    magnesium oxide (MAG-OX) 400 mg, Daily    Multiple Vitamins-Minerals (multivitamin with minerals) tablet 1 tablet, Oral, Daily    mupirocin (BACTROBAN) 2 % ointment Apply topically to the inside of the left and right nostrils twice daily for 5 days before surgery, including the morning of surgery.    other medication, see sig, Medication/product name: estradiol 1 mg/ progesterone 100 mg/ testosterone 2 mg/ml  Strength: as above  Sig (include dose, route, frequency): apply 0.5 ml to inner thigh or labia daily    tretinoin (RETIN-A) 0.025 % cream APPLY PEA SIZE AMOUNT TO FACE NIGHTLY AS TOLERATED    valsartan-hydrochlorothiazide (DIOVAN-HCT) 160-25 MG per tablet 1 tablet, Daily           PRE-OP WORKSHEET DATA    Assessment of Pre-Operative Risks     MLJ Quality Hard Stops:    BMI (<40) : Estimated body mass index is 29.7 kg/m² as calculated from the following:    Height as of this encounter: 5' 10\" (1.778 m).    Weight as of this encounter: 93.9 kg (207 lb).    Hgb ( >11):   Lab Results   Component Value Date    HGB 14.0 12/23/2024    HGB 12.7 10/06/2020       HbA1c (<7.5) :   Lab Results   Component Value Date    HGBA1C 5.8 (H) 12/23/2024       GFR (>60) (Less then 45 = Nephrology consult):    Lab Results   Component Value Date    EGFR 76 12/23/2024    EGFR 72 10/06/2020            Pre-Op Data Reviewed:       Laboratory Results: I have personally reviewed the pertinent laboratory results/reports     EKG:I have personally reviewed pertinent reports.  . I personally reviewed and interpreted available tracings in the electronic medical record    Encounter Date: 12/23/24   ECG 12 lead   Result Value    Ventricular Rate 59    Atrial Rate 59    NV Interval 140    QRSD Interval 74    QT Interval 448    QTC Interval 444    P Axis 46    QRS Axis 3    T Wave Axis 49    Narrative    " Sinus bradycardia  Low voltage QRS  Nonspecific T wave abnormality  Abnormal ECG  No previous ECGs available  Confirmed by Des Del Rosario ([id number]) on 12/27/2024 11:46:33 PM       OLD RECORDS: reviewed old records in the chart review section if EHR on day of visit.    Previous cardiopulmonary studies within the past year:  Echocardiogram: no   Cardiac Catheterization: no  Stress Test: no      Time of visit including pre-visit chart review, visit and post-visit coordination of plan and care , review of pre-surgical lab work, preparation and time spent documenting note in electronic medical record, time spent face-to-face in physical examination answering patient questions by care team 35 minutes             [facility]

## 2024-12-23 NOTE — PATIENT INSTRUCTIONS
BEFORE SURGERY    Contact your surgical nurse  navigator with any questions regarding preoperative plan or schedule.  Stop all over the counter supplements, herbal, naturopathic  medications for 1 week prior to surgery UNLESS prescribed by your surgeon  Hold NSAIDS (i.e. advil, alleve, motrin, ibuprofen, celebrex) minimum 5 days prior to surgery  Follow presurgical medication instructions provided by preadmission nursing team reviewed during your presurgery phone call  Strategies for optimizing your surgery through breathing exercises, nutrition and physical activity can be found at [email or web address]/best  Call [phone] with any presurgical concerns or medications questions or use the messaging feature in your [employer] kev to contact your provider    AFTER SURGERY    Recommend using Tylenol ( acetaminophen ) 1000 mg every eight hours during the first week post discharge along with icing the area for 20 mins every 3-4 hours while awake can be helpful in reducing your need for post operative opioid use. This opioid sparing plan can be used along side your surgeons pain plan.  Use stool softener over the counter (colace) daily after surgery during the first 1-2 weeks to avoid post operative constipation issues  If no bowel movement within 3 days after surgery then use over the counter Miralax in addition to your stool softener   If cleared by your surgical team for activity then early and often walking is encouraged and can be important in prevention of post surgical blood clots. Additionally spend as much time out of bed as possible and allowed by your surgical team  Use your incentive spirometer twice per hour in the first seven days after surgery to help prevent post surgery lung complications and infections  It is very important you follow the instructions from your surgeon regarding any medications for after surgery blood clot prevention. Compliance with these medications is very important.  Call [phone] with  any post discharge concerns or medical issues or use the messaging feature in your [employer] kev to contact your provider

## 2024-12-24 LAB — MRSA NOSE QL CULT: NORMAL

## 2024-12-27 LAB
ATRIAL RATE: 59 BPM
P AXIS: 46 DEGREES
PR INTERVAL: 140 MS
QRS AXIS: 3 DEGREES
QRSD INTERVAL: 74 MS
QT INTERVAL: 448 MS
QTC INTERVAL: 444 MS
T WAVE AXIS: 49 DEGREES
VENTRICULAR RATE: 59 BPM

## 2024-12-27 PROCEDURE — 93010 ELECTROCARDIOGRAM REPORT: CPT | Performed by: INTERNAL MEDICINE

## 2024-12-30 ENCOUNTER — TELEPHONE (OUTPATIENT)
Age: 74
End: 2024-12-30

## 2024-12-30 ENCOUNTER — OFFICE VISIT (OUTPATIENT)
Age: 74
End: 2024-12-30
Payer: MEDICARE

## 2024-12-30 VITALS
OXYGEN SATURATION: 97 % | WEIGHT: 207 LBS | DIASTOLIC BLOOD PRESSURE: 80 MMHG | SYSTOLIC BLOOD PRESSURE: 130 MMHG | HEART RATE: 73 BPM | BODY MASS INDEX: 29.63 KG/M2 | HEIGHT: 70 IN

## 2024-12-30 DIAGNOSIS — Z01.818 PREOPERATIVE CLEARANCE: Primary | ICD-10-CM

## 2024-12-30 DIAGNOSIS — R73.01 IFG (IMPAIRED FASTING GLUCOSE): ICD-10-CM

## 2024-12-30 DIAGNOSIS — M17.11 PRIMARY OSTEOARTHRITIS OF RIGHT KNEE: ICD-10-CM

## 2024-12-30 DIAGNOSIS — I10 ESSENTIAL HYPERTENSION: ICD-10-CM

## 2024-12-30 DIAGNOSIS — M25.561 RIGHT KNEE PAIN, UNSPECIFIED CHRONICITY: ICD-10-CM

## 2024-12-30 PROCEDURE — 99205 OFFICE O/P NEW HI 60 MIN: CPT | Performed by: INTERNAL MEDICINE

## 2024-12-30 NOTE — ASSESSMENT & PLAN NOTE
Failed outpatient conservative measures  Elected to undergo total joint arthroplasty  Currently she is having reconsideration about proceeding with surgery and would like to delay it for three months

## 2024-12-30 NOTE — ASSESSMENT & PLAN NOTE
Follow consistent carb diet  Follow instructions specified in AVS regarding  dosing or holding diabetic medications and (if applicable )any insulin instructions as per the after visit summary

## 2024-12-30 NOTE — TELEPHONE ENCOUNTER
Left voicemail for pt to confirmed I received the message from Dr. Harley - patient is postponing surgery.

## 2025-01-09 DIAGNOSIS — M17.11 PRIMARY OSTEOARTHRITIS OF RIGHT KNEE: ICD-10-CM

## 2025-01-09 RX ORDER — DICLOFENAC SODIUM 75 MG/1
75 TABLET, DELAYED RELEASE ORAL 2 TIMES DAILY
Qty: 60 TABLET | Refills: 1 | Status: SHIPPED | OUTPATIENT
Start: 2025-01-09

## 2025-01-15 DIAGNOSIS — M17.11 PRIMARY OSTEOARTHRITIS OF RIGHT KNEE: ICD-10-CM

## 2025-01-15 DIAGNOSIS — M25.561 RIGHT KNEE PAIN, UNSPECIFIED CHRONICITY: ICD-10-CM

## 2025-01-15 RX ORDER — FOLIC ACID 1 MG/1
1000 TABLET ORAL DAILY
Qty: 90 TABLET | Refills: 1 | Status: SHIPPED | OUTPATIENT
Start: 2025-01-15

## 2025-03-03 ENCOUNTER — OFFICE VISIT (OUTPATIENT)
Dept: OBGYN CLINIC | Facility: CLINIC | Age: 75
End: 2025-03-03
Payer: MEDICARE

## 2025-03-03 VITALS
DIASTOLIC BLOOD PRESSURE: 62 MMHG | BODY MASS INDEX: 28.49 KG/M2 | WEIGHT: 199 LBS | SYSTOLIC BLOOD PRESSURE: 122 MMHG | HEIGHT: 70 IN

## 2025-03-03 DIAGNOSIS — E28.39 ESTROGEN DEFICIENCY: ICD-10-CM

## 2025-03-03 DIAGNOSIS — Z12.31 ENCOUNTER FOR SCREENING MAMMOGRAM FOR MALIGNANT NEOPLASM OF BREAST: Primary | ICD-10-CM

## 2025-03-03 PROCEDURE — 99214 OFFICE O/P EST MOD 30 MIN: CPT | Performed by: OBSTETRICS & GYNECOLOGY

## 2025-03-03 RX ORDER — NICOTINE POLACRILEX 2 MG
GUM BUCCAL
COMMUNITY

## 2025-03-03 RX ORDER — HYDROCORTISONE 25 MG/G
CREAM TOPICAL
COMMUNITY
Start: 2025-02-25

## 2025-03-04 NOTE — PROGRESS NOTES
PROBLEM GYNECOLOGICAL VISIT    Mikaela Oliver is a 74 y.o. female who presents today with complaint of pimple on vulva.    Her general medical history has been reviewed and she reports it as follows:    Past Medical History:   Diagnosis Date   • Fibroids    • Gastric reflux    • Hypertension      Past Surgical History:   Procedure Laterality Date   • CHOLECYSTECTOMY     • HYSTERECTOMY      MARCELLA/BSO   • KNEE SURGERY  2020    Left replacement   • MAMMO (HISTORICAL)       OB History    No obstetric history on file.       Social History     Tobacco Use   • Smoking status: Never   • Smokeless tobacco: Never   Vaping Use   • Vaping status: Never Used   Substance Use Topics   • Alcohol use: Yes     Alcohol/week: 3.0 standard drinks of alcohol     Types: 3 Standard drinks or equivalent per week   • Drug use: Never       Current Outpatient Medications   Medication Instructions   • ascorbic acid (VITAMIN C) 500 mg, Daily   • ascorbic acid (VITAMIN C) 500 mg, Oral, 2 times daily   • Biotin 1 MG CAPS Biotin   • celecoxib (CELEBREX) 100 mg, Oral, Daily   • Cholecalciferol (VITAMIN D3) 1,000 Units, Daily   • Cholecalciferol (VITAMIN D3) 2,000 Units, Oral, Daily   • cyclobenzaprine (FLEXERIL) 5 mg tablet 3 times daily   • diclofenac (VOLTAREN) 75 mg, Oral, 2 times daily   • estradiol (ESTRACE VAGINAL) 1 g, Vaginal, 3 times weekly   • folic acid (FOLVITE) 1,000 mcg, Oral, Daily   • hydrocortisone 2.5 % cream APPLY TO AFFECTED AREA TWICE A DAY 2 WKS ON & 2 WKS OFF-REPEAT AS NEEDED   • magnesium oxide (MAG-OX) 400 mg, Daily   • Multiple Vitamins-Minerals (multivitamin with minerals) tablet 1 tablet, Oral, Daily   • mupirocin (BACTROBAN) 2 % ointment Apply topically to the inside of the left and right nostrils twice daily for 5 days before surgery, including the morning of surgery.   • Omega 3 340 MG CPDR Omega 3   • other medication, see sig, Medication/product name: estradiol 1 mg/ progesterone 100 mg/ testosterone 2 mg/ml  Strength:  "as above  Sig (include dose, route, frequency): apply 0.5 ml to inner thigh or labia daily   • tretinoin (RETIN-A) 0.025 % cream APPLY PEA SIZE AMOUNT TO FACE NIGHTLY AS TOLERATED   • valsartan-hydrochlorothiazide (DIOVAN-HCT) 160-25 MG per tablet 1 tablet, Daily       History of Present Illness:   + right  pimple  on labia.  No trauma to area.  No  abn dc,  no pain  Same partner denies bleeding.    Review of Systems:  Review of Systems   Constitutional: Negative.    Genitourinary:  Positive for genital sores. Negative for decreased urine volume, difficulty urinating, dyspareunia, dysuria, hematuria, menstrual problem, pelvic pain, urgency, vaginal bleeding, vaginal discharge and vaginal pain.   Musculoskeletal:  Negative for back pain.   Skin: Negative.    Allergic/Immunologic: Negative.    Neurological: Negative.    Hematological: Negative.    Psychiatric/Behavioral: Negative.         Physical Exam:  /62 (BP Location: Left arm, Patient Position: Sitting, Cuff Size: Large)   Ht 5' 10\" (1.778 m)   Wt 90.3 kg (199 lb)   BMI 28.55 kg/m²   Physical Exam  Constitutional:       Appearance: Normal appearance.   Genitourinary:      Bladder, rectum and urethral meatus normal.      Genitourinary Comments: Right labia  majora  noted  less than one  cm  inclusion cyst.   Soft   non tender      Right Labia: skin changes.      Right Labia: No rash, tenderness, lesions or Bartholin's cyst.     Left Labia: No tenderness, lesions, skin changes, Bartholin's cyst or rash.     No labial fusion noted.            No inguinal adenopathy present in the right or left side.     No vaginal discharge, erythema, tenderness, bleeding, ulceration or granulation tissue.      No vaginal prolapse present.     Mild vaginal atrophy present.       Right Adnexa: not tender, not full and no mass present.     Left Adnexa: not tender, not full and no mass present.     No cervical motion tenderness, discharge, friability or nabothian cyst.      No " parametrium nodularity or thickening present.     Uterus is not enlarged or fixed.      No urethral prolapse or tenderness present.      Pelvic floor neuro is intact.     Pelvic exam was performed with patient in the lithotomy position.   Abdominal:      Palpations: Abdomen is soft.      Hernia: There is no hernia in the left inguinal area or right inguinal area.   Musculoskeletal:         General: Normal range of motion.   Lymphadenopathy:      Lower Body: No right inguinal adenopathy. No left inguinal adenopathy.   Neurological:      Mental Status: She is alert and oriented to person, place, and time.   Skin:     General: Skin is warm and dry.   Psychiatric:         Mood and Affect: Mood normal.         Behavior: Behavior normal.         Thought Content: Thought content normal.         Judgment: Judgment normal.         Assessment:   1. Occlusion cyst  Menopausal  2.  Screening  for breast cancer    Plan:   Open to air  no  under wear to bed at night  . May use small amt of  steroid ointment  bid.  Report increase in  pain abn DC. I have spent a total time of 30 minutes in caring for this patient on the day of the visit/encounter including Risks and benefits of tx options, Importance of tx compliance, Counseling / Coordination of care, Documenting in the medical record, Reviewing/placing orders in the medical record (including tests, medications, and/or procedures), and Obtaining or reviewing history  .       Reviewed with patient that test results are available in [facility] immediately, but that they will not necessarily be reviewed by me immediately.  Explained that I will review results at my earliest opportunity and contact patient appropriately.

## 2025-03-13 ENCOUNTER — HOSPITAL ENCOUNTER (OUTPATIENT)
Dept: MAMMOGRAPHY | Facility: IMAGING CENTER | Age: 75
Discharge: HOME | End: 2025-03-13
Payer: MEDICARE

## 2025-03-13 VITALS — BODY MASS INDEX: 27.92 KG/M2 | WEIGHT: 195 LBS | HEIGHT: 70 IN

## 2025-03-13 DIAGNOSIS — Z12.31 ENCOUNTER FOR SCREENING MAMMOGRAM FOR MALIGNANT NEOPLASM OF BREAST: ICD-10-CM

## 2025-03-13 PROCEDURE — 77063 BREAST TOMOSYNTHESIS BI: CPT

## 2025-03-13 PROCEDURE — 77067 SCR MAMMO BI INCL CAD: CPT

## 2025-03-21 ENCOUNTER — TELEPHONE (OUTPATIENT)
Age: 75
End: 2025-03-21

## 2025-03-21 NOTE — TELEPHONE ENCOUNTER
Pt  called to  get  results  of Mammogram  done  on 03/13/2025 she requesting  a call back  as he is  having  trouble  with MyChart

## 2025-04-11 ENCOUNTER — TELEPHONE (OUTPATIENT)
Age: 75
End: 2025-04-11

## 2025-04-11 DIAGNOSIS — N95.1 MENOPAUSAL SYMPTOMS: ICD-10-CM

## 2025-04-11 NOTE — TELEPHONE ENCOUNTER
Scheduled date of colonoscopy (as of today): 05/06/2025  Physician performing colonoscopy: Reji  Location of colonoscopy: [facility]  Bowel prep reviewed with patient: ERICA/DUL  Prep instructions sent via email  Instructions reviewed with patient by: LIZETTE  Clearances: N/A

## 2025-04-11 NOTE — TELEPHONE ENCOUNTER
04/11/25  Screened by: Gloria Richardson    Referring Provider     Pre- Screening: Yes    There is no height or weight on file to calculate BMI.  Has patient been referred for a routine screening Colonoscopy? yes  Is the patient between 45-75 years old? yes      Previous Colonoscopy yes   If yes:    Date: 10yrs    Facility:     Reason:     Does the patient want to see a Gastroenterologist prior to their procedure OR are they having any GI symptoms? no    Has the patient been hospitalized or had abdominal surgery in the past 6 months? no    Does the patient use supplemental oxygen? no    Does the patient take Coumadin, Lovenox, Plavix, Elliquis, Xarelto, or other blood thinning medication? no    Has the patient had a stroke, cardiac event, or stent placed in the past year? no

## 2025-04-18 ENCOUNTER — TELEPHONE (OUTPATIENT)
Dept: GASTROENTEROLOGY | Facility: CLINIC | Age: 75
End: 2025-04-18

## 2025-05-06 ENCOUNTER — ANESTHESIA (OUTPATIENT)
Dept: GASTROENTEROLOGY | Facility: AMBULATORY SURGERY CENTER | Age: 75
End: 2025-05-06

## 2025-05-06 ENCOUNTER — ANESTHESIA EVENT (OUTPATIENT)
Dept: GASTROENTEROLOGY | Facility: AMBULATORY SURGERY CENTER | Age: 75
End: 2025-05-06

## 2025-05-06 ENCOUNTER — HOSPITAL ENCOUNTER (OUTPATIENT)
Dept: GASTROENTEROLOGY | Facility: AMBULATORY SURGERY CENTER | Age: 75
Discharge: HOME | End: 2025-05-06
Attending: INTERNAL MEDICINE
Payer: MEDICARE

## 2025-05-06 VITALS
BODY MASS INDEX: 27.92 KG/M2 | TEMPERATURE: 97.5 F | SYSTOLIC BLOOD PRESSURE: 112 MMHG | DIASTOLIC BLOOD PRESSURE: 64 MMHG | OXYGEN SATURATION: 100 % | RESPIRATION RATE: 18 BRPM | HEART RATE: 54 BPM | HEIGHT: 70 IN | WEIGHT: 195 LBS

## 2025-05-06 DIAGNOSIS — Z12.11 SCREENING FOR COLON CANCER: ICD-10-CM

## 2025-05-06 PROCEDURE — 88305 TISSUE EXAM BY PATHOLOGIST: CPT | Performed by: PATHOLOGY

## 2025-05-06 PROCEDURE — 45380 COLONOSCOPY AND BIOPSY: CPT | Performed by: INTERNAL MEDICINE

## 2025-05-06 RX ORDER — PROPOFOL 10 MG/ML
INJECTION, EMULSION INTRAVENOUS AS NEEDED
Status: DISCONTINUED | OUTPATIENT
Start: 2025-05-06 | End: 2025-05-06

## 2025-05-06 RX ORDER — SODIUM CHLORIDE, SODIUM LACTATE, POTASSIUM CHLORIDE, CALCIUM CHLORIDE 600; 310; 30; 20 MG/100ML; MG/100ML; MG/100ML; MG/100ML
50 INJECTION, SOLUTION INTRAVENOUS CONTINUOUS
Status: DISCONTINUED | OUTPATIENT
Start: 2025-05-06 | End: 2025-05-10 | Stop reason: HOSPADM

## 2025-05-06 RX ADMIN — PROPOFOL 50 MG: 10 INJECTION, EMULSION INTRAVENOUS at 10:32

## 2025-05-06 RX ADMIN — PROPOFOL 150 MG: 10 INJECTION, EMULSION INTRAVENOUS at 10:27

## 2025-05-06 RX ADMIN — PROPOFOL 50 MG: 10 INJECTION, EMULSION INTRAVENOUS at 10:38

## 2025-05-06 RX ADMIN — PROPOFOL 50 MG: 10 INJECTION, EMULSION INTRAVENOUS at 10:43

## 2025-05-06 RX ADMIN — SODIUM CHLORIDE, SODIUM LACTATE, POTASSIUM CHLORIDE, CALCIUM CHLORIDE 50 ML/HR: 600; 310; 30; 20 INJECTION, SOLUTION INTRAVENOUS at 10:11

## 2025-05-06 NOTE — ANESTHESIA PREPROCEDURE EVALUATION
Procedure:  COLONOSCOPY    Relevant Problems   ANESTHESIA (within normal limits)      CARDIO   (+) Essential hypertension      Other   (+) Dyslipidemia        Physical Exam    Airway    Mallampati score: I  TM Distance: >3 FB  Neck ROM: full     Dental   No notable dental hx     Cardiovascular  Cardiovascular exam normal    Pulmonary  Pulmonary exam normal     Other Findings  post-pubertal.      Anesthesia Plan  ASA Score- 2     Anesthesia Type- IV sedation with anesthesia with ASA Monitors.         Additional Monitors:     Airway Plan:     Comment: I discussed risks (reviewed with patient on the anesthesia consent form), benefits and alternatives of monitored sedation including the possibility under sedation to have recall or mild discomfort.  .       Plan Factors-    Chart reviewed.    Patient summary reviewed.                  Induction- intravenous.    Postoperative Plan-         Informed Consent- Anesthetic plan and risks discussed with patient.  I personally reviewed this patient with the CRNA. Discussed and agreed on the Anesthesia Plan with the CRNA..      NPO Status:  Vitals Value Taken Time   Date of last liquid 05/06/25 05/06/25 0953   Time of last liquid 2230 05/06/25 0953   Date of last solid 05/04/25 05/06/25 0953   Time of last solid 1800 05/06/25 0953

## 2025-05-06 NOTE — ANESTHESIA POSTPROCEDURE EVALUATION
Post-Op Assessment Note    CV Status:  Stable  Pain Score: 0    Pain management: adequate       Mental Status:  Alert and awake   Hydration Status:  Euvolemic   PONV Controlled:  Controlled   Airway Patency:  Patent     Post Op Vitals Reviewed: Yes    No anethesia notable event occurred.    Staff: CRNA           Last Filed PACU Vitals:  Vitals Value Taken Time   Temp 98 1047   Pulse 60    /57    Resp 16    SpO2 10

## 2025-05-06 NOTE — H&P
History and Physical - [facility]  Mikaela Oliver 74 y.o. female MRN: [id number]    HPI: Mikaela Oliver is a 74 y.o. year old female who presents forshe has had double exams in the past.  Possible history of colon polyps    REVIEW OF SYSTEMS: Per the HPI, and otherwise unremarkable.    Historical Information   Past Medical History:   Diagnosis Date    Fibroids     Gastric reflux     Hypertension      Past Surgical History:   Procedure Laterality Date    CHOLECYSTECTOMY      HEEL SPUR SURGERY Right     HYSTERECTOMY  2000    MARCELLA/BSO    KNEE SURGERY  2020    Left replacement    MAMMO (HISTORICAL)      OOPHORECTOMY Bilateral 2000     Social History   Social History     Substance and Sexual Activity   Alcohol Use Yes    Alcohol/week: 3.0 standard drinks of alcohol    Types: 3 Standard drinks or equivalent per week     Social History     Substance and Sexual Activity   Drug Use Never     Social History     Tobacco Use   Smoking Status Never   Smokeless Tobacco Never     Family History   Problem Relation Age of Onset    Pancreatic cancer Mother 80    No Known Problems Father     No Known Problems Sister     No Known Problems Maternal Grandmother     No Known Problems Maternal Grandfather     No Known Problems Paternal Grandmother     No Known Problems Paternal Grandfather     Breast cancer Paternal Aunt         50's    No Known Problems Paternal Aunt        Meds/Allergies       Current Outpatient Medications:     ascorbic acid (VITAMIN C) 500 MG tablet    Biotin 1 MG CAPS    Cholecalciferol (VITAMIN D3) 1,000 units tablet    folic acid (FOLVITE) 1 mg tablet    magnesium oxide (MAG-OX) 400 mg tablet    Omega 3 340 MG CPDR    other medication, see sig,    tretinoin (RETIN-A) 0.025 % cream    valsartan-hydrochlorothiazide (DIOVAN-HCT) 160-25 MG per tablet    ascorbic acid (VITAMIN C) 500 MG tablet    Cholecalciferol 25 MCG (1000 UT) tablet    estradiol (ESTRACE VAGINAL) 0.1 mg/g vaginal cream     "hydrocortisone 2.5 % cream    Current Facility-Administered Medications:     lactated ringers infusion, 50 mL/hr, Intravenous, Continuous, 50 mL/hr at 05/06/25 1011    No Known Allergies    Objective     /58   Pulse 68   Temp 97.5 °F (36.4 °C) (Temporal)   Resp 17   Ht 5' 10\" (1.778 m)   Wt 88.5 kg (195 lb)   SpO2 99%   BMI 27.98 kg/m²     PHYSICAL EXAM    Gen: NAD AAOx3  Head: Normocephalic, Atraumatic  CV: S1S2 RRR no m/r/g  CHEST: Clear b/l no c/r/w  ABD: soft, +BS NT/ND  EXT: no edema    ASSESSMENT/PLAN:  This is a 74 y.o. year old female here for colonoscopy, and she is stable and optimized for her procedure.        "

## 2025-05-09 ENCOUNTER — RESULTS FOLLOW-UP (OUTPATIENT)
Dept: GASTROENTEROLOGY | Facility: CLINIC | Age: 75
End: 2025-05-09

## 2025-05-09 PROCEDURE — 88305 TISSUE EXAM BY PATHOLOGIST: CPT | Performed by: PATHOLOGY
